# Patient Record
(demographics unavailable — no encounter records)

---

## 2017-07-19 NOTE — PDOC
History of Present Illness





- General


Chief Complaint: Injury


Stated Complaint: LACERATION


Time Seen by Provider: 07/19/17 17:18


History Source: Patient


Exam Limitations: No Limitations





- History of Present Illness


Initial Comments: 





07/19/17 17:38


Patient is a 63-year-old male, history of hypertension high cholesterol 

presents to the Emergency department for evaluation of laceration to right 

medial ankle and the left lateral fifth finger.  Patient states that he dropped 

a large glass jug on the floor and it broke and he sustained the above 

lacerations.  Reports that he had a tetanus two years ago.  





Past Medical History: Denies.  





Allergies: No known allergies





Medications: see medication list





Family History: Non-contributory





Social History: Denies smoking, alcohol use, or IVDU





Review of Systems


GENERAL/CONSTITUTIONAL: No fever or chills. No weakness. No weight change.


RESPIRATORY: No cough, wheezing, or hemoptysis.


MUSCULOSKELETAL: No joint or muscle swelling or pain. No neck or back pain.


SKIN : 6 cm laceration to the right medial ankle, 3 cm laceration to the 

posterior left ankle, 4 cm flap laceration to the right fifth finger.   


NEUROLOGIC: No headache, vertigo, loss of consciousness, or loss of sensation.


PSYCHIATRIC: No depression or anxiety.


ENDOCRINE: No increased thirst. No abnormal weight change.


HEMATOLOGIC/LYMPHATIC: No anemia, easy bleeding, or history of blood clots.


ALLERGIC/IMMUNOLOGIC: No hives or skin allergy. No latex allergy.





Physical Exam: 


GENERAL: The patient is awake, alert, and fully oriented, in no acute distress.


LUNGS: Breath sounds equal, clear to auscultation bilaterally.  No wheezes, and 

no crackles.


HEART: Regular rate and rhythm, normal S1 and S2 without murmur, rub or gallop.


ABDOMEN: Soft, nontender, normoactive bowel sounds.  No guarding, no rebound.  

No masses. No bruising or abrasions


MUSCULOSKELETAL: Normal range of motion, no edema.  No clubbing or cyanosis. No 

cords, erythema, or tenderness. No CVA Tenderness with fist.


SKIN: 6 cm laceration to the right medial ankle, 3 cm laceration to the 

posterior left ankle, 4 cm flap laceration to the right fifth finger. .





Past History





- Past Medical History


Allergies/Adverse Reactions: 


 Allergies











Allergy/AdvReac Type Severity Reaction Status Date / Time


 


No Known Allergies Allergy   Verified 07/19/17 16:54











Home Medications: 


Ambulatory Orders





Metoprolol Succinate [Toprol Xl] 100 mg PO DAILY 11/09/16 


Oxycodone HCl [Roxicodone -] 5 mg PO Q4H PRN #30 tablet MDD 60mg 11/12/16 


Amlodipine Besylate 10 mg PO ASDIR 07/19/17 








Diabetes: Yes


GI Disorders: No


HTN: Yes





- Surgical History


Cholecystectomy: Yes (11/11/16)





- Psycho/Social/Smoking Cessation Hx


Anxiety: No


Suicidal Ideation: No


Smoking History: Never smoked


Number of Cigarettes Smoked Daily: 0


Hx Alcohol Use: No


Drug/Substance Use Hx: No


Substance Use Type: None





*Physical Exam





- Vital Signs


 Last Vital Signs











Temp Pulse Resp BP Pulse Ox


 


 98.8 F   84   20   135/71   97 


 


 07/19/17 16:52  07/19/17 16:52  07/19/17 16:52  07/19/17 16:52  07/19/17 16:52














Procedures





- Laceration/Wound Repair


  ** Right Medial Ankle


Wound Length: 5.0 to 7.5 cm


Wound Explored: clean


Wound's Depth, Shape: linear


Irrigated w/ Saline: Yes


Betadine Prep: Yes


Anesthesia: 1% Lidocaine


Amount of Anesthetic (ccs): 6


Wound Debrided: moderate


Wound Repaired With: Sutures


Suture Size/Type: 5:0


Number of Sutures: 10


Layer Closure: No





  ** Right Finger


Wound Length: 2.6 to 5.0 cm


Wound Explored: clean


Wound's Depth, Shape: linear


Irrigated w/ Saline: Yes


Betadine Prep: Yes


Anesthesia: 1% Lidocaine


Amount of Anesthetic (ccs): 2


Wound Repaired With: Sutures


Suture Size/Type: 5:0


Number of Sutures: 5


Layer Closure: No





Medical Decision Making





- Medical Decision Making





07/19/17 18:28


A/P: Patient here for laceration repairs, see procedure note. Follow-up as 

instructed. 





*DC/Admit/Observation/Transfer


Diagnosis at time of Disposition: 


 Lacerations of multiple sites without complication





- Discharge Dispostion


Disposition: HOME


Condition at time of disposition: Good


Admit: No





- Referrals


Referrals: 


Xiomara Roy MD [Primary Care Provider] - 





- Patient Instructions


Additional Instructions: 


Keep area clean dry and intact


Keep dressing on until tomorrow


If any increased bleeding through the dressing return immediately to emergency 

department


Keep area clean dry and intact bacitracin x3 days, then let it dry out


Please return in  10   days for suture removal. 


Please return immediately to emergency department with any increased redness, 

swelling, signs of infection








- Post Discharge Activity


Work/School Note:  Back to Work

## 2017-07-23 NOTE — PDOC
Suture Removal/Wound Check HPI





- History of Present Illness


Chief Complaint: Revisit,Wound Recheck


Stated Complaint: INJURY


Time Seen by Provider: 07/23/17 14:36


History Source: Yes: Patient


Exam Limitations: Yes: No Limitations


Treated at: [facility] ED





- Previous ED Treatment


Type of procedure performed on last visit: Yes: Laceration Repair


Tetanus Immunization: Yes: Up to Date





Past History





- Travel


Traveled outside of the country in the last 30 days: No


Close contact w/someone who was outside of country & ill: No





- Past Medical History


Allergies/Adverse Reactions: 


Allergies





No Known Allergies Allergy (Verified 07/23/17 13:45)


 








Home Medications: 


Ambulatory Orders





Metoprolol Succinate [Toprol Xl] 100 mg PO DAILY 11/09/16 


Oxycodone HCl [Roxicodone -] 5 mg PO Q4H PRN #30 tablet MDD 60mg 11/12/16 


Amlodipine Besylate 10 mg PO ASDIR 07/19/17 








General: Yes: no pertinent history





- Immunization History


Tetanus Status: Unknown





- Social History


Smoking Status: Never smoked


Number of Ciarettes Per Day: 0





Suture Removal/Wound Check PE





- Physical Exam


Laceration/Wound Check Symptoms: reports: None


Current Severity Level: None


Maximum Severity Level: None


Location of Laceration/Wound: right: Finger (well healed V shaped laceration- 

no redness/ swelling or hematoma), left: Ankle (healing suture line, not well 

approximated thus far, no evidence of redness, swelling or exudate. Has some 

ecchymoses noted inferiorly   to foot)





*Review of Systems





- Review of Systems


Able to Perform ROS?: Yes


Constitutional: Yes: See HPI.  No: Symptoms Reported, Fever


HEENTM: No: Symptoms Reported


Respiratory: No: Symptoms reported


Musculoskeletal: Yes: Symptoms Reported, See HPI, Joint Stiffness (left heel/

ankle)


Integumentary: Yes: Symptoms Reported, See HPI, Bruising


All Other Systems: Reviewed and Negative





Medical Decision Making





- Medical Decision Making





07/23/17 14:59


Check, right index finger and left he'll appear to be approximating well, 

reapply bacitracin ointment dressing and splint added to finger with 

instructions for post care. Will return in 10-14 days for suture removal





*DC/Admit/Observation/Transfer


Diagnosis at time of Disposition: 


 Encounter for re-check of laceration wound





- Discharge Dispostion


Disposition: HOME


Condition at time of disposition: Stable


Admit: No





- Patient Instructions


Printed Discharge Instructions:  How to Care for a Surgical Wound


Additional Instructions: 


Rest, elevate, strenuous exercise or activity until sutures removed


antabiotic ointment and dressing for the next 3 days then allowed to dry





And 14 days when wounds appear to be well-healed





- Post Discharge Activity


Work/School Note:  Back to Work